# Patient Record
Sex: FEMALE | NOT HISPANIC OR LATINO | URBAN - METROPOLITAN AREA
[De-identification: names, ages, dates, MRNs, and addresses within clinical notes are randomized per-mention and may not be internally consistent; named-entity substitution may affect disease eponyms.]

---
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
## 2022-04-02 ASSESSMENT — TONOMETRY
OS_IOP_MMHG: 15
OD_IOP_MMHG: 15
OD_IOP_MMHG: 15
OS_IOP_MMHG: 14

## 2022-04-02 ASSESSMENT — KERATOMETRY
OS_AXISANGLE_DEGREES: 174
OS_K2POWER_DIOPTERS: 42.00
OS_AXISANGLE2_DEGREES: 084
OD_AXISANGLE_DEGREES: 180
OD_K1POWER_DIOPTERS: 40.00
OS_K1POWER_DIOPTERS: 40.25
OD_K2POWER_DIOPTERS: 42.25
OD_AXISANGLE2_DEGREES: 090

## 2022-04-02 ASSESSMENT — VISUAL ACUITY
OD_CC: 20/100
OD_CC: J1+
OD_SC: 20/80
OS_CC: 20/100
OS_CC: 20/100
OD_SC: 20/30
OS_SC: 20/30
OD_CC: 20/40
OS_CC: J1+
OS_SC: 20/60